# Patient Record
(demographics unavailable — no encounter records)

---

## 2024-11-22 NOTE — ASSESSMENT
[FreeTextEntry1] : 63 yo male with pmhx and presentation as above cad/pci of lad with seble htn/dm/dyslipidemia cont all meds aggressive risk modif heart healthy diet and act as tolerated 2023 stress echo reviewed, low risk - cont with med rx for cad rtc 6 m, stress echo next visit

## 2025-05-30 NOTE — ASSESSMENT
[FreeTextEntry1] : 65 yo male with pmhx and presentation as above cad/pci of lad with seble htn/dm/dyslipidemia cont all meds aggressive risk modif heart healthy diet and act as tolerated labs reviewed, lipids at goal needs better dm mx 2025 stress echo reviewed, low risk - cont with med rx for cad rtc 6 m

## 2025-05-30 NOTE — HISTORY OF PRESENT ILLNESS
[FreeTextEntry1] : 63 yo male with pmhx as below is here for a f/up visit 1/21 admitted to [facility] with cp, s/p ccta, followed by cath and ifr guided pci of lad with seble started on gdmr and sent home with card f/up recent stress echo no major cvs complains et is stable ros is otherwise unrem